# Patient Record
Sex: FEMALE | Race: WHITE | NOT HISPANIC OR LATINO | URBAN - METROPOLITAN AREA
[De-identification: names, ages, dates, MRNs, and addresses within clinical notes are randomized per-mention and may not be internally consistent; named-entity substitution may affect disease eponyms.]

---

## 2017-01-30 ENCOUNTER — IMPORTED ENCOUNTER (OUTPATIENT)
Dept: URBAN - METROPOLITAN AREA CLINIC 9 | Facility: CLINIC | Age: 67
End: 2017-01-30

## 2017-02-23 ENCOUNTER — IMPORTED ENCOUNTER (OUTPATIENT)
Dept: URBAN - METROPOLITAN AREA CLINIC 9 | Facility: CLINIC | Age: 67
End: 2017-02-23

## 2018-02-15 ENCOUNTER — IMPORTED ENCOUNTER (OUTPATIENT)
Dept: URBAN - METROPOLITAN AREA CLINIC 9 | Facility: CLINIC | Age: 68
End: 2018-02-15

## 2018-08-01 ENCOUNTER — IMPORTED ENCOUNTER (OUTPATIENT)
Dept: URBAN - METROPOLITAN AREA CLINIC 9 | Facility: CLINIC | Age: 68
End: 2018-08-01

## 2019-08-20 ENCOUNTER — IMPORTED ENCOUNTER (OUTPATIENT)
Dept: URBAN - METROPOLITAN AREA CLINIC 9 | Facility: CLINIC | Age: 69
End: 2019-08-20

## 2020-08-10 ENCOUNTER — IMPORTED ENCOUNTER (OUTPATIENT)
Dept: URBAN - METROPOLITAN AREA CLINIC 9 | Facility: CLINIC | Age: 70
End: 2020-08-10

## 2021-02-16 NOTE — PATIENT DISCUSSION
POAG, OU: IOP WNL. PT INSTRUCTED TO CONTINUE TIMOLOL BID OU. TRIAL OFF LATANOPROST. DISC PHOTOS AND PACHY DONE TODAY. RNFL AND GONIO AT F/U. SCHEDULE VF PRIOR.

## 2021-02-16 NOTE — PATIENT DISCUSSION
New Prescription: timolol maleate (timolol maleate): drops: 0.5% 1 drop twice a day into both eyes 02-

## 2021-02-16 NOTE — PATIENT DISCUSSION
[facility], OD:  REFER TO RETINAL SPECIALIST, DR. Rene Lanier FOR EVALUATION AND TREATMENT. RETURN FOR FOLLOW-UP AS SCHEDULED.

## 2021-09-01 ENCOUNTER — IMPORTED ENCOUNTER (OUTPATIENT)
Dept: URBAN - METROPOLITAN AREA CLINIC 9 | Facility: CLINIC | Age: 71
End: 2021-09-01

## 2021-09-01 PROBLEM — H04.123: Noted: 2021-09-01

## 2021-09-01 PROBLEM — H43.811: Noted: 2021-09-01

## 2021-10-18 ASSESSMENT — KERATOMETRY
OS_AXISANGLE_DEGREES: 163
OD_K2POWER_DIOPTERS: 40.25
OS_K1POWER_DIOPTERS: 41.75
OS_AXISANGLE2_DEGREES: 73
OD_AXISANGLE_DEGREES: 10
OS_K2POWER_DIOPTERS: 40
OD_K1POWER_DIOPTERS: 41.25
OD_AXISANGLE2_DEGREES: 100

## 2021-10-18 ASSESSMENT — TONOMETRY
OD_IOP_MMHG: 14
OS_IOP_MMHG: 16
OD_IOP_MMHG: 19
OD_IOP_MMHG: 15
OS_IOP_MMHG: 15
OD_IOP_MMHG: 16
OS_IOP_MMHG: 16
OS_IOP_MMHG: 17
OD_IOP_MMHG: 17
OS_IOP_MMHG: 16
OD_IOP_MMHG: 18
OS_IOP_MMHG: 17
OS_IOP_MMHG: 18

## 2021-10-18 ASSESSMENT — VISUAL ACUITY
OS_CC: 20/25 SN
OD_CC: 20/20 -2 SN
OS_SC: 20/40 SN
OD_CC: 20/20 SN
OS_CC: 20/20 - SN
OS_CC: 20/25 + SN
OD_SC: 20/30 + SN
OS_CC: 20/25 SN
OD_PH: 20/40 SN
OS_CC: 20/20 SN
OS_PH: 20/30 SN
OS_SC: 20/40 - SN
OS_PH: 20/40 -2 SN
OS_CC: 20/60 SN
OS_CC: 20/20 SN
OS_SC: 20/30 - SN
OD_CC: 20/20 SN
OD_CC: 20/30 + SN
OD_SC: 20/30 + SN
OS_CC: 20/25 SN
OS_SC: 20/20 - SN
OD_SC: 20/40 SN
OD_CC: 20/20 - SN
OD_CC: 20/25 SN
OD_CC: 20/20 SN
OD_CC: 20/20 - SN
OD_SC: 20/40 -2 SN

## 2021-10-25 NOTE — PATIENT DISCUSSION
Not visually significant at this time.  Recommend observation.  Discussed treatment options for visually significant dislocation of lens implant.

## 2022-08-31 ENCOUNTER — ESTABLISHED PATIENT (OUTPATIENT)
Dept: URBAN - METROPOLITAN AREA CLINIC 17 | Facility: CLINIC | Age: 72
End: 2022-08-31

## 2022-08-31 DIAGNOSIS — H04.123: ICD-10-CM

## 2022-08-31 DIAGNOSIS — H43.811: ICD-10-CM

## 2022-08-31 DIAGNOSIS — Z96.1: ICD-10-CM

## 2022-08-31 PROCEDURE — 92015 DETERMINE REFRACTIVE STATE: CPT

## 2022-08-31 PROCEDURE — 92014 COMPRE OPH EXAM EST PT 1/>: CPT

## 2022-08-31 ASSESSMENT — VISUAL ACUITY
OS_CC: 20/20-1
OS_SC: 20/30-1
OD_CC: 20/20-1
OU_SC: 20/30-1
OD_SC: 20/30-1
OU_CC: 20/20

## 2022-08-31 ASSESSMENT — TONOMETRY
OD_IOP_MMHG: 16
OS_IOP_MMHG: 16

## 2023-09-19 ENCOUNTER — ESTABLISHED PATIENT (OUTPATIENT)
Dept: URBAN - METROPOLITAN AREA CLINIC 17 | Facility: CLINIC | Age: 73
End: 2023-09-19

## 2023-09-19 DIAGNOSIS — H43.811: ICD-10-CM

## 2023-09-19 DIAGNOSIS — H04.123: ICD-10-CM

## 2023-09-19 PROCEDURE — 92014 COMPRE OPH EXAM EST PT 1/>: CPT

## 2023-09-19 ASSESSMENT — VISUAL ACUITY
OD_CC: 20/20-2
OS_CC: 20/25+1

## 2023-09-19 ASSESSMENT — TONOMETRY
OS_IOP_MMHG: 16
OD_IOP_MMHG: 16

## 2024-11-07 ENCOUNTER — COMPREHENSIVE EXAM (OUTPATIENT)
Dept: URBAN - METROPOLITAN AREA CLINIC 17 | Facility: CLINIC | Age: 74
End: 2024-11-07

## 2024-11-07 DIAGNOSIS — H43.811: ICD-10-CM

## 2024-11-07 DIAGNOSIS — H04.123: ICD-10-CM

## 2024-11-07 PROCEDURE — 99214 OFFICE O/P EST MOD 30 MIN: CPT
